# Patient Record
Sex: FEMALE | Race: WHITE | NOT HISPANIC OR LATINO | Employment: FULL TIME | URBAN - METROPOLITAN AREA
[De-identification: names, ages, dates, MRNs, and addresses within clinical notes are randomized per-mention and may not be internally consistent; named-entity substitution may affect disease eponyms.]

---

## 2020-11-02 ENCOUNTER — HOSPITAL ENCOUNTER (OUTPATIENT)
Dept: LAB | Facility: HOSPITAL | Age: 20
Discharge: HOME | End: 2020-11-02
Attending: PEDIATRICS

## 2020-11-04 LAB
C TRACH RRNA CVX QL NAA+PROBE: NEGATIVE
N GONORRHOEA DNA SPEC QL NAA+PROBE: NEGATIVE

## 2021-12-21 ENCOUNTER — OFFICE VISIT (OUTPATIENT)
Dept: INTERNAL MEDICINE | Facility: CLINIC | Age: 21
End: 2021-12-21

## 2021-12-21 VITALS
OXYGEN SATURATION: 97 % | HEART RATE: 51 BPM | SYSTOLIC BLOOD PRESSURE: 112 MMHG | BODY MASS INDEX: 34.13 KG/M2 | TEMPERATURE: 97.5 F | DIASTOLIC BLOOD PRESSURE: 70 MMHG | WEIGHT: 225.2 LBS | HEIGHT: 68 IN | RESPIRATION RATE: 18 BRPM

## 2021-12-21 DIAGNOSIS — Z76.89 ENCOUNTER TO ESTABLISH CARE: Primary | ICD-10-CM

## 2021-12-21 DIAGNOSIS — M25.511 CHRONIC RIGHT SHOULDER PAIN: ICD-10-CM

## 2021-12-21 DIAGNOSIS — R53.83 FATIGUE, UNSPECIFIED TYPE: ICD-10-CM

## 2021-12-21 DIAGNOSIS — Z13.220 SCREENING FOR LIPID DISORDERS: ICD-10-CM

## 2021-12-21 DIAGNOSIS — G89.29 CHRONIC RIGHT SHOULDER PAIN: ICD-10-CM

## 2021-12-21 DIAGNOSIS — N30.00 ACUTE CYSTITIS WITHOUT HEMATURIA: ICD-10-CM

## 2021-12-21 DIAGNOSIS — R39.9 UTI SYMPTOMS: ICD-10-CM

## 2021-12-21 LAB
BILIRUB BLD-MCNC: ABNORMAL MG/DL
CLARITY, POC: ABNORMAL
COLOR UR: ABNORMAL
EXPIRATION DATE: ABNORMAL
GLUCOSE UR STRIP-MCNC: NEGATIVE MG/DL
KETONES UR QL: NEGATIVE
LEUKOCYTE EST, POC: ABNORMAL
Lab: ABNORMAL
NITRITE UR-MCNC: POSITIVE MG/ML
PH UR: 6 [PH] (ref 5–8)
PROT UR STRIP-MCNC: ABNORMAL MG/DL
RBC # UR STRIP: NEGATIVE /UL
SP GR UR: 1.03 (ref 1–1.03)
UROBILINOGEN UR QL: ABNORMAL

## 2021-12-21 PROCEDURE — 87086 URINE CULTURE/COLONY COUNT: CPT

## 2021-12-21 PROCEDURE — 81003 URINALYSIS AUTO W/O SCOPE: CPT

## 2021-12-21 PROCEDURE — 99204 OFFICE O/P NEW MOD 45 MIN: CPT

## 2021-12-21 PROCEDURE — 87186 SC STD MICRODIL/AGAR DIL: CPT

## 2021-12-21 RX ORDER — CIPROFLOXACIN 250 MG/1
250 TABLET, FILM COATED ORAL 2 TIMES DAILY
Qty: 10 TABLET | Refills: 0 | Status: SHIPPED | OUTPATIENT
Start: 2021-12-21 | End: 2021-12-21

## 2021-12-21 RX ORDER — FLUCONAZOLE 150 MG/1
150 TABLET ORAL ONCE
Qty: 1 TABLET | Refills: 0 | Status: SHIPPED | OUTPATIENT
Start: 2021-12-21 | End: 2021-12-21

## 2021-12-21 RX ORDER — CEPHALEXIN 500 MG/1
500 CAPSULE ORAL 3 TIMES DAILY
Qty: 21 CAPSULE | Refills: 0 | Status: SHIPPED | OUTPATIENT
Start: 2021-12-21 | End: 2022-11-21

## 2021-12-21 NOTE — ASSESSMENT & PLAN NOTE
Patient started with UTI symptoms approximately 3 days ago.  She has urinary frequency, dysuria and pressure.  No hematuria.  She has been taking Azo.  She denies flank pain.  She states she has had multiple UTIs in the past used to get them frequently.  Urinalysis is positive for bacteria and nitrites.  Plan: We will start keflex.  Patient admits that she does get yeast infections sometimes with antibiotics.  Prescription given for Diflucan as well.  Send urine off for culture.

## 2021-12-21 NOTE — PROGRESS NOTES
"Chief Complaint  Establish Care, Urinary Tract Infection (pain when urinating, urgency with little flow.), Shoulder Problem (was seen at  and was told to follow up with PCP, may have torn rotater cuff), and Immunizations (wants to get flu immunization)    Subjective          History of Present Illness  Emilee Flores presents to [facility]  New pt, establish care.   She does have a PMH including SVT. She used to be on beta blocker but was able to wean herself off. She did see cardiology. Dr. NOEL CHAVEZT resolved itself.  No recent episodes.    Last PCP was in 2018.    Shoulder pain x 3 months. No known injury. She is having a hard time sleeping. Certain ROM exercises cause pain.  Urgent care-shoulder x-ray negative. She has been taking diclofenac and it helps some. Lifting arm behind head makes it worse.    UTI symptoms-Urinary frequency, dysuria, and pressure for 3 days. No hematuria. She has been taking azo. She has had UTI's in the past.     Pap-Not yet.  Counseled.  Flu-Today  COVID 19-UTD      Objective   Vital Signs:   /70 (BP Location: Left arm, Patient Position: Sitting, Cuff Size: Large Adult)   Pulse 51   Temp 97.5 °F (36.4 °C) (Temporal)   Resp 18   Ht 171.5 cm (67.5\")   Wt 102 kg (225 lb 3.2 oz)   SpO2 97%   BMI 34.75 kg/m²     Physical Exam  Eyes:      Extraocular Movements: Extraocular movements intact.      Conjunctiva/sclera: Conjunctivae normal.   Cardiovascular:      Rate and Rhythm: Normal rate and regular rhythm.      Pulses: Normal pulses.      Heart sounds: Normal heart sounds.   Pulmonary:      Effort: Pulmonary effort is normal. No respiratory distress.      Breath sounds: Normal breath sounds. No stridor. No wheezing, rhonchi or rales.   Chest:      Chest wall: No tenderness.   Abdominal:      General: Bowel sounds are normal.      Palpations: Abdomen is soft.      Tenderness: There is no abdominal tenderness. There is no right CVA tenderness or " left CVA tenderness.   Musculoskeletal:      Right shoulder: Decreased range of motion.      Cervical back: Normal range of motion and neck supple.      Right lower leg: No edema.      Left lower leg: No edema.   Skin:     General: Skin is warm and dry.   Neurological:      Mental Status: She is alert and oriented to person, place, and time.   Psychiatric:         Mood and Affect: Mood normal.         Behavior: Behavior normal.         Thought Content: Thought content normal.         Judgment: Judgment normal.        Result Review :                 Assessment and Plan    Diagnoses and all orders for this visit:    1. Encounter to establish care (Primary)    2. Chronic right shoulder pain  Assessment & Plan:  Patient planes of chronic right shoulder pain.  She states has been present for the last 3 months and it is progressively gotten worse.  No known significant injury.  She states she is having a hard time sleeping.  She states that certain range of motion exercises cause pain.  Mostly when reaching backwards.  She states she cannot put her hair up.  She had a shoulder x-ray and urgent care and it did come back negative.  They gave her some diclofenac and that is helping some.  She states it makes it tolerable.  Plan: We will start physical therapy and try to get an MRI of her shoulder.  Continue using diclofenac as needed for discomfort.    Orders:  -     MRI Shoulder Right Without Contrast; Future  -     Ambulatory Referral to Physical Therapy Evaluate and treat    3. Acute cystitis without hematuria  Assessment & Plan:  Patient started with UTI symptoms approximately 3 days ago.  She has urinary frequency, dysuria and pressure.  No hematuria.  She has been taking Azo.  She denies flank pain.  She states she has had multiple UTIs in the past used to get them frequently.  Urinalysis is positive for bacteria and nitrites.  Plan: We will start keflex.  Patient admits that she does get yeast infections sometimes  with antibiotics.  Prescription given for Diflucan as well.  Send urine off for culture.        4. Fatigue, unspecified type  -     CBC & Differential; Future  -     Comprehensive Metabolic Panel; Future  -     Lipid Panel; Future  -     Iron Profile; Future  -     Vitamin B12 & Folate; Future  -     Vitamin D 25 Hydroxy; Future  -     TSH+Free T4; Future    5. Screening for lipid disorders  -     Lipid Panel; Future    6. UTI symptoms  -     POCT urinalysis dipstick, automated  -     Urine Culture - Urine, Urine, Clean Catch    Other orders  -     Discontinue: ciprofloxacin (Cipro) 250 MG tablet; Take 1 tablet by mouth 2 (Two) Times a Day.  Dispense: 10 tablet; Refill: 0  -     fluconazole (Diflucan) 150 MG tablet; Take 1 tablet by mouth 1 (One) Time for 1 dose.  Dispense: 1 tablet; Refill: 0  -     cephalexin (Keflex) 500 MG capsule; Take 1 capsule by mouth 3 (Three) Times a Day.  Dispense: 21 capsule; Refill: 0      Follow Up   Return in about 8 months (around 8/8/2022).  Patient was given instructions and counseling regarding her condition or for health maintenance advice. Please see specific information pulled into the AVS if appropriate.

## 2021-12-21 NOTE — ASSESSMENT & PLAN NOTE
Patient planes of chronic right shoulder pain.  She states has been present for the last 3 months and it is progressively gotten worse.  No known significant injury.  She states she is having a hard time sleeping.  She states that certain range of motion exercises cause pain.  Mostly when reaching backwards.  She states she cannot put her hair up.  She had a shoulder x-ray and urgent care and it did come back negative.  They gave her some diclofenac and that is helping some.  She states it makes it tolerable.  Plan: We will start physical therapy and try to get an MRI of her shoulder.  Continue using diclofenac as needed for discomfort.

## 2021-12-22 ENCOUNTER — PATIENT ROUNDING (BHMG ONLY) (OUTPATIENT)
Dept: INTERNAL MEDICINE | Facility: CLINIC | Age: 21
End: 2021-12-22

## 2021-12-22 NOTE — PROGRESS NOTES
December 22, 2021    Hello, may I speak with Emilee Flores?    My name is Joan Greenwood    I am  with [facility]  [address] [address].    Before we get started may I verify your date of birth? 2000    I am calling to officially welcome you to our practice and ask about your recent visit. Is this a good time to talk? yes    Tell me about your visit with us. What things went well?  it went really well       We're always looking for ways to make our patients' experiences even better. Do you have recommendations on ways we may improve?  no    Overall were you satisfied with your first visit to our practice? yes       I appreciate you taking the time to speak with me today. Is there anything else I can do for you? no      Thank you, and have a great day.

## 2021-12-26 LAB
BACTERIA UR CULT: ABNORMAL
BACTERIA UR CULT: ABNORMAL
OTHER ANTIBIOTIC SUSC ISLT: ABNORMAL

## 2021-12-27 DIAGNOSIS — B37.9 CANDIDIASIS: Primary | ICD-10-CM

## 2021-12-27 RX ORDER — FLUCONAZOLE 150 MG/1
TABLET ORAL
Qty: 2 TABLET | Refills: 0 | Status: SHIPPED | OUTPATIENT
Start: 2021-12-27 | End: 2022-11-21

## 2022-01-12 ENCOUNTER — HOSPITAL ENCOUNTER (OUTPATIENT)
Dept: MRI IMAGING | Facility: HOSPITAL | Age: 22
Discharge: HOME | End: 2022-01-12

## 2022-01-12 DIAGNOSIS — M25.511 CHRONIC RIGHT SHOULDER PAIN: ICD-10-CM

## 2022-01-12 DIAGNOSIS — G89.29 CHRONIC RIGHT SHOULDER PAIN: ICD-10-CM

## 2022-01-12 PROCEDURE — 73221 MRI JOINT UPR EXTREM W/O DYE: CPT

## 2022-01-13 ENCOUNTER — TREATMENT (OUTPATIENT)
Dept: PHYSICAL THERAPY | Facility: CLINIC | Age: 22
End: 2022-01-13

## 2022-01-13 DIAGNOSIS — M25.511 ACUTE PAIN OF RIGHT SHOULDER: Primary | ICD-10-CM

## 2022-01-13 DIAGNOSIS — R29.898 SHOULDER WEAKNESS: ICD-10-CM

## 2022-01-13 PROCEDURE — 97161 PT EVAL LOW COMPLEX 20 MIN: CPT | Performed by: PHYSICAL THERAPIST

## 2022-01-13 PROCEDURE — 97110 THERAPEUTIC EXERCISES: CPT | Performed by: PHYSICAL THERAPIST

## 2022-01-13 NOTE — PROGRESS NOTES
Physical Therapy Initial Evaluation and Plan of Care    Patient: Emilee Flores   : 2000  Diagnosis/ICD-10 Code:  Acute pain of right shoulder [M25.511]  Referring practitioner: BRENNAN Pitts  Date of Initial Visit: 2022  Today's Date: 2022  Patient seen for 1 sessions           Subjective Questionnaire: QuickDASH: 27 or 20-39 % limited      Subjective Evaluation    History of Present Illness  Mechanism of injury: Pt reports about three months ago she started having more pain in her R shoulder towards the outside.  She can't do a lot of activity with her arm, such as raising her arm and doing her hair.  She also reports pain with reaching behind her back.  She states she has been off of work and is resting so her pain is reduced.  Pt works at [employer].  Pt reports tingling in her shoulder.  Pt had an MRI performed which was negative.     Medical history: SVT (no longer taking medicine)    Pain  Current pain ratin  At best pain ratin  At worst pain ratin  Quality: dull ache  Aggravating factors: overhead activity, lifting, outstretched reach and movement  Progression: worsening    Diagnostic Tests  X-ray: normal  MRI studies: normal    Treatments  No previous or current treatments  Patient Goals  Patient goals for therapy: decreased pain, increased motion, increased strength, independence with ADLs/IADLs and return to work             Objective          Static Posture     Head  Forward.    Shoulders  Rounded.    Thoracic Spine  Hyperkyphosis.    Palpation     Right Tenderness of the pectoralis major, supraspinatus and upper trapezius.     Neurological Testing     Sensation     Shoulder   Left Shoulder   Intact: light touch    Right Shoulder   Intact: light touch    Additional Neurological Details  Tingling around R GH joint    Active Range of Motion     Right Shoulder   Normal active range of motion    Passive Range of Motion     Right Shoulder   Normal passive range of motion    Joint  Play   Left Shoulder  Joints within functional limits are the posterior capsule and inferior capsule. Hypermobile in the anterior capsule.     Right Shoulder  Joints within functional limits are the posterior capsule and inferior capsule. Hypermobile in the anterior capsule.     Strength/Myotome Testing     Left Shoulder     Isolated Muscles   Lower trapezius: 4-   Middle trapezius: 4-     Right Shoulder     Planes of Motion   Flexion: 4   Abduction: 4   External rotation at 0°: 4   Internal rotation at 0°: 5     Isolated Muscles   Lower trapezius: 4-   Middle trapezius: 4-     Tests     Right Shoulder   Positive empty can.   Negative crossover, Hawkin's, lift-off and Neer's.         See Exercise, Manual, and Modality Logs for complete treatment.       Assessment & Plan     Assessment  Impairments: impaired physical strength, lacks appropriate home exercise program and pain with function  Functional Limitations: carrying objects, lifting, pulling, reaching behind back, reaching overhead and unable to perform repetitive tasks  Assessment details: Pt presents with limitations, noted below, that impede her ability to lift objects overhead, do her hair, and perform ADLs.  The patient presents with a diagnosis of R shoulder pain and has R shoulder weakness, postural impairments, and poor scapular stability and will benefit from therapeutic exercises, manual therapy, and modalities to improve tolerance to functional activities. The skills of a therapist will be required to safely and effectively implement the following treatment plan to restore maximal level of function.     Prognosis: good    Goals  Plan Goals: 1. Carrying, Moving, and Handling Objects Functional Limitation     LTG 1: 12 weeks:  The patient will demonstrate 1-19% limitation by achieving a score of 12 on the Quick DASH.   STATUS:  New   STG 1a: 6 weeks:  The patient will demonstrate 1-19% limitation by achieving a score of 19 on the Quick DASH.      STATUS:  New      2. The patient has limited strength of the R shoulder.   LTG 2: 12 weeks:  The patient will demonstrate 5/5 strength for R shoulder flexion, abduction, and external rotation and 4+/5 strength for middle and lower trap in order to demonstrate improved shoulder stability.   STATUS:  New   STG 2a: 6 weeks:   The patient will demonstrate 4+/5 strength for R shoulder flexion, abduction, and external rotation and 4/5 strength for middle and lower trap in order to demonstrate improved shoulder stability. STATUS:  New   STG2b:  6 weeks:  The patient will be independent with home exercises.    STATUS:  New      3. The patient complains of pain to the R shoulder.   LTG 3: 12 weeks:  The patient will report a pain rating of 2/10 or better in order to improve sleep quality and tolerance to performance of activities of daily living.   STATUS:  New   STG 3a: 6 weeks:  The patient will report a pain rating of 3/10 or better.    STATUS:  New     TREATMENT: Manual therapy, therapeutic exercise, home exercise instruction, and modalities as needed to include: electrical stimulation, ultrasound, moist heat, and ice.     Plan  Therapy options: will be seen for skilled therapy services  Planned modality interventions: cryotherapy, dry needling, electrical stimulation/[place] stimulation, ultrasound and hydrotherapy  Planned therapy interventions: flexibility, functional ROM exercises, home exercise program, joint mobilization, manual therapy, neuromuscular re-education, postural training, soft tissue mobilization, spinal/joint mobilization, strengthening and stretching  Frequency: 3x week  Duration in weeks: 12  Treatment plan discussed with: patient        History # of Personal Factors and/or Comorbidities: LOW (0)  Examination of Body System(s): # of elements: LOW (1-2)  Clinical Presentation: STABLE   Clinical Decision Making: LOW       Timed:         Manual Therapy:         mins  91357;     Therapeutic Exercise:     8     mins  23030;     Neuromuscular Kenroy:        mins  82010;    Therapeutic Activity:          mins  36138;     Gait Training:           mins  80679;     Ultrasound:          mins  54621;    Ionto                                   mins   66939  Self Care                            mins   14083  Canalith Repos         mins 99634      Un-Timed:  Electrical Stimulation:         mins  39254 ( );  Dry Needling          mins self-pay  Traction          mins 72399  Low Eval     25     Mins  10259  Mod Eval          Mins  09198  High Eval                            Mins  55866  Re-Eval                               mins  13796        Timed Treatment:   8   mins   Total Treatment:     33   mins    PT SIGNATURE: Electronically signed by JUAN Pacheco License: [id number]      Initial Certification  Certification Period: 1/13/2022 thru 4/12/2022  I certify that the therapy services are furnished while this patient is under my care.  The services outlined above are required by this patient, and will be reviewed every 90 days.     PHYSICIAN: Breann Stevens APRN      DATE:     Please sign and return via fax to [phone].Thank you, [facility].

## 2022-01-17 ENCOUNTER — OFFICE VISIT (OUTPATIENT)
Dept: INTERNAL MEDICINE | Facility: CLINIC | Age: 22
End: 2022-01-17

## 2022-01-17 VITALS
DIASTOLIC BLOOD PRESSURE: 62 MMHG | HEART RATE: 90 BPM | SYSTOLIC BLOOD PRESSURE: 112 MMHG | WEIGHT: 218.8 LBS | OXYGEN SATURATION: 98 % | BODY MASS INDEX: 33.16 KG/M2 | RESPIRATION RATE: 18 BRPM | HEIGHT: 68 IN

## 2022-01-17 DIAGNOSIS — G89.29 CHRONIC RIGHT SHOULDER PAIN: Primary | ICD-10-CM

## 2022-01-17 DIAGNOSIS — M25.511 CHRONIC RIGHT SHOULDER PAIN: Primary | ICD-10-CM

## 2022-01-17 DIAGNOSIS — Z11.59 NEED FOR HEPATITIS C SCREENING TEST: ICD-10-CM

## 2022-01-17 DIAGNOSIS — Z00.00 ANNUAL PHYSICAL EXAM: ICD-10-CM

## 2022-01-17 PROCEDURE — 99395 PREV VISIT EST AGE 18-39: CPT

## 2022-01-17 RX ORDER — CYCLOBENZAPRINE HCL 10 MG
10 TABLET ORAL 3 TIMES DAILY PRN
Qty: 30 TABLET | Refills: 0 | Status: SHIPPED | OUTPATIENT
Start: 2022-01-17 | End: 2022-11-21

## 2022-01-17 NOTE — PROGRESS NOTES
"Chief Complaint  Follow-up (had XR and MRI done. Keflex and Diflucan was given to her on first visit and it did help.)    Subjective          History of Present Illness  Emilee Flores presents to [facility]  Follow up on Right shoulder pain and annual physical.   She had x-ray and MRI, both negative. She has also been going to PT. She was sent home with exercise bands. PT wants to see her twice/week for a few weeks and if not better, go to Ortho. Pain is worse after use for an extended period of time. She is not back to work yet. She is supposed to go back to work tomorrow.     Flu-recommended  COVID-has had  Exercise-she has started exercising. She does some lower body exercises a few days/week.  Smoke-No cig use  Denies alcohol use  Wears seat belt.  Pap-recommended. We had a long discussion regarding PAP, OBGYN, and birth control. She is going to do some research regarding the different contraceptives that we discussed today. She is considering going to OBGYN with PAP and contraceptives but would like to think about it.   She does monthly self breast exams.  Menses are normal.    Objective   Vital Signs:   /62 (BP Location: Left arm, Patient Position: Sitting, Cuff Size: Adult)   Pulse 90   Resp 18   Ht 171.5 cm (67.5\")   Wt 99.2 kg (218 lb 12.8 oz)   SpO2 98%   BMI 33.76 kg/m²     Physical Exam  Constitutional:       Appearance: Normal appearance.   HENT:      Head: Normocephalic and atraumatic.      Mouth/Throat:      Mouth: Mucous membranes are moist.      Pharynx: Oropharynx is clear.   Eyes:      Extraocular Movements: Extraocular movements intact.      Conjunctiva/sclera: Conjunctivae normal.   Cardiovascular:      Rate and Rhythm: Normal rate and regular rhythm.      Pulses: Normal pulses.      Heart sounds: Normal heart sounds.   Pulmonary:      Effort: Pulmonary effort is normal. No respiratory distress.      Breath sounds: No stridor. No wheezing, rhonchi " or rales.   Abdominal:      General: Bowel sounds are normal. There is no distension.      Palpations: Abdomen is soft.      Tenderness: There is no abdominal tenderness.   Musculoskeletal:         General: Normal range of motion.      Cervical back: Normal range of motion and neck supple. No tenderness.      Right lower leg: No edema.      Left lower leg: No edema.   Lymphadenopathy:      Cervical: No cervical adenopathy.   Skin:     General: Skin is warm and dry.      Coloration: Skin is pale.   Neurological:      General: No focal deficit present.      Mental Status: She is alert and oriented to person, place, and time.   Psychiatric:         Mood and Affect: Mood normal.         Behavior: Behavior normal.         Thought Content: Thought content normal.         Judgment: Judgment normal.        Result Review :                 Assessment and Plan    Diagnoses and all orders for this visit:    1. Chronic right shoulder pain (Primary)  Assessment & Plan:  She had x-ray and MRI, both negative. She has also been going to PT. She was sent home with exercise bands and they want to see her twice/week for a few weeks and if not better, go to Ortho. Pain is worse after use for an extended period of time. She is not back to work yet. She is supposed to go back to work tomorrow. Note written for light duty. I gave her diclofenac for PRN use. She has pain in her right shoulder and she states that the pain extends to her upper trapezius area. She denies any neck involvement.   Plan: PT. If no improvement, referral to ortho. Rx for flexeril also given for PRN use.       2. Annual physical exam    3. Need for hepatitis C screening test  -     Hepatitis C antibody; Future    Other orders  -     cyclobenzaprine (FLEXERIL) 10 MG tablet; Take 1 tablet by mouth 3 (Three) Times a Day As Needed for Muscle Spasms.  Dispense: 30 tablet; Refill: 0      Follow Up   No follow-ups on file.  Patient was given instructions and counseling  regarding her condition or for health maintenance advice. Please see specific information pulled into the AVS if appropriate.

## 2022-01-17 NOTE — ASSESSMENT & PLAN NOTE
She had x-ray and MRI, both negative. She has also been going to PT. She was sent home with exercise bands and they want to see her twice/week for a few weeks and if not better, go to Ortho. Pain is worse after use for an extended period of time. She is not back to work yet. She is supposed to go back to work tomorrow. Note written for light duty. I gave her diclofenac for PRN use. She has pain in her right shoulder and she states that the pain extends to her upper trapezius area. She denies any neck involvement.   Plan: PT. If no improvement, referral to ortho. Rx for flexeril also given for PRN use.

## 2022-01-19 ENCOUNTER — TELEPHONE (OUTPATIENT)
Dept: PHYSICAL THERAPY | Facility: CLINIC | Age: 22
End: 2022-01-19

## 2022-02-08 ENCOUNTER — TREATMENT (OUTPATIENT)
Dept: PHYSICAL THERAPY | Facility: CLINIC | Age: 22
End: 2022-02-08

## 2022-02-08 DIAGNOSIS — M25.511 ACUTE PAIN OF RIGHT SHOULDER: Primary | ICD-10-CM

## 2022-02-08 DIAGNOSIS — R29.898 SHOULDER WEAKNESS: ICD-10-CM

## 2022-02-08 PROCEDURE — 97110 THERAPEUTIC EXERCISES: CPT | Performed by: PHYSICAL THERAPIST

## 2022-02-08 NOTE — PROGRESS NOTES
Progress Assessment        Patient: Emilee Flores   : 2000  Diagnosis/ICD-10 Code:  Acute pain of right shoulder [M25.511]  Referring practitioner: BRENNAN Pitts  Date of Initial Visit: Type: THERAPY  Noted: 2022  Today's Date: 2022  Patient seen for 2 sessions      Subjective:   Emilee Flores reports: she has not been to her therapy appointments because of Covid and car issues.  She states she has performed her exercises when she can remember, which has only been five or six times since her evaluation.  She states her shoulder is a little better.  She denies pain currently but states it increases to a 5/10 at worst.  She states she avoids using it for repetitive activities.     Subjective Questionnaire: QuickDASH: 23 or 20-39% limited  Clinical Progress: unchanged  Home Program Compliance: No  Treatment has included: therapeutic exercise    Subjective     Objective   Strength/Myotome Testing     Left Shoulder      Isolated Muscles   Lower trapezius: 4-   Middle trapezius: 4-     Right Shoulder      Planes of Motion   Flexion: 4   Abduction: 4   External rotation at 0°: 4   Internal rotation at 0°: 5      Isolated Muscles   Lower trapezius: 4-   Middle trapezius: 4-     Assessment/Plan  Pt has only attended her initial evaluation and has not had any treatment sessions yet.  She has performed her HEP 5-6 times since starting therapy.  Pt has had limited progress towards goals as she has not yet had PT treatment sessions.  Her shoulder/scapular stability/strength remains the same.  Pain remains unchanged.  Her score on the Quick DASH improved slightly.  Pt requires continued PT to increase strength and stability of shoulder and decrease pain.    Plan Goals: 1. Carrying, Moving, and Handling Objects Functional Limitation                        LTG 1: 12 weeks:  The patient will demonstrate 1-19% limitation by achieving a score of 12 on the Quick DASH.   STATUS:  ongoing  STG 1a: 6 weeks:  The patient  will demonstrate 1-19% limitation by achieving a score of 19 on the Quick DASH.     STATUS:  progressing     2. The patient has limited strength of the R shoulder.   LTG 2: 12 weeks:  The patient will demonstrate 5/5 strength for R shoulder flexion, abduction, and external rotation and 4+/5 strength for middle and lower trap in order to demonstrate improved shoulder stability.   STATUS:  ongoing  STG 2a: 6 weeks:   The patient will demonstrate 4+/5 strength for R shoulder flexion, abduction, and external rotation and 4/5 strength for middle and lower trap in order to demonstrate improved shoulder stability. STATUS:  ongoing   STG2b:  6 weeks:  The patient will be independent with home exercises.    STATUS:  ongoing     3. The patient complains of pain to the R shoulder.   LTG 3: 12 weeks:  The patient will report a pain rating of 2/10 or better in order to improve sleep quality and tolerance to performance of activities of daily living.   STATUS:  ongoing  STG 3a: 6 weeks:  The patient will report a pain rating of 3/10 or better.    STATUS:  ongoing    Progress toward previous goals: Not Met    See Exercise, Manual, and Modality Logs for complete treatment.         Recommendations: Continue as planned  Timeframe: 3 months  Prognosis to achieve goals: good    PT Signature: Electronically signed by Liat Leos PT  [place] License: [id number]      Based upon review of the patient's progress and continued therapy plan, it is my medical opinion that Emilee Flores should continue physical therapy treatment at [facility]  [address]  [place] [address] [phone].      Timed:         Manual Therapy:         mins  95252;     Therapeutic Exercise:    23     mins  21940;     Neuromuscular Kenroy:        mins  86463;    Therapeutic Activity:          mins  98430;     Gait Training:           mins  42489;     Ultrasound:          mins  32672;    Ionto                                    mins   66822  Self Care                            mins   49464  Aquatic                               mins 04476      Un-Timed:  Electrical Stimulation:         mins  63630 ( );  Dry Needling          mins self-pay  Traction          mins 35946  Low Eval          Mins  80249  Mod Eval          Mins  13902  High Eval                            Mins  20886  Re-Eval                               mins  33532      Timed Treatment:   23   mins   Total Treatment:     23   mins      I certify that the therapy services are furnished while this patient is under my care.  The services outlined above are required by this patient, and will be reviewed every 90 days.

## 2022-02-11 ENCOUNTER — TREATMENT (OUTPATIENT)
Dept: PHYSICAL THERAPY | Facility: CLINIC | Age: 22
End: 2022-02-11

## 2022-02-11 DIAGNOSIS — R29.898 SHOULDER WEAKNESS: ICD-10-CM

## 2022-02-11 DIAGNOSIS — M25.511 ACUTE PAIN OF RIGHT SHOULDER: Primary | ICD-10-CM

## 2022-02-11 PROCEDURE — 97110 THERAPEUTIC EXERCISES: CPT | Performed by: PHYSICAL THERAPIST

## 2022-02-11 NOTE — PROGRESS NOTES
Physical Therapy Daily Treatment Note      Patient: Emilee Flores   : 2000  Referring practitioner: BRENNAN Pitts  Date of Initial Visit: Type: THERAPY  Noted: 2022  Today's Date: 2022  Patient seen for 3 sessions           Subjective Questionnaire:       Subjective Evaluation    History of Present Illness    Subjective comment: Pt reported mornings are the best so she has minimal pain.         Objective   See Exercise, Manual, and Modality Logs for complete treatment.       Assessment & Plan     Assessment    Assessment details: Pt is off work secondary to limited RUE function and pain. Pt reported feeling a know in her shoulder after therapeutic exercis.   Continue with PT to improve pt's RUE function.        Visit Diagnoses:    ICD-10-CM ICD-9-CM   1. Acute pain of right shoulder  M25.511 719.41   2. Shoulder weakness  R29.898 719.61       Progress per Plan of Care and Progress strengthening /stabilization /functional activity           Timed:  Manual Therapy:         mins  39360;  Therapeutic Exercise:    25     mins  89881;     Neuromuscular Kenroy:        mins  35569;    Therapeutic Activity:          mins  64328;     Gait Training:           mins  95775;     Ultrasound:          mins  71715;    Electrical Stimulation:         mins  68306 ( );  Aquatic Therapy          mins  67220    Untimed:  Electrical Stimulation:         mins  01696 ( );  Mechanical Traction:         mins  81082;     Timed Treatment:   25   mins   Total Treatment:     25   mins    Electronically signed    Zaida Melendez PTA  Physical Therapist Assistant    [place] license: [id number]

## 2022-02-14 ENCOUNTER — TREATMENT (OUTPATIENT)
Dept: PHYSICAL THERAPY | Facility: CLINIC | Age: 22
End: 2022-02-14

## 2022-02-14 DIAGNOSIS — M25.511 ACUTE PAIN OF RIGHT SHOULDER: Primary | ICD-10-CM

## 2022-02-14 DIAGNOSIS — R29.898 SHOULDER WEAKNESS: ICD-10-CM

## 2022-02-14 PROCEDURE — 97530 THERAPEUTIC ACTIVITIES: CPT | Performed by: PHYSICAL THERAPIST

## 2022-02-14 PROCEDURE — 97110 THERAPEUTIC EXERCISES: CPT | Performed by: PHYSICAL THERAPIST

## 2022-02-14 NOTE — PROGRESS NOTES
Physical Therapy Daily Progress Note    Patient: Emilee Flores   : 2000  Diagnosis/ICD-10 Code:  Acute pain of right shoulder [M25.511]  Referring practitioner: BRENNAN Pitts  Date of Initial Visit: Type: THERAPY  Noted: 2022  Today's Date: 2022  Patient seen for 4 sessions           Subjective   The patient reported no new complaints today. She has been compliant with her home exercises and she feels like her shoulder is improving.     Objective   See Exercise, Manual, and Modality Logs for complete treatment.     Assessment/Plan   The patient demonstrated good tolerance to a progression of shoulder strengthening exercise today. Continue with current PT plan of care.       Timed:  Manual Therapy:    0     mins  51963;  Therapeutic Exercise:    18     mins  86009;     Neuromuscular Kenroy:   0    mins  38565;    Therapeutic Activity:     8     mins  52823;     Gait Trainin     mins  19838;     Aquatics                         0      mins  00899    Un-timed:  Mechanical Traction      0     mins  32330  Dry Needling     0     mins self-pay  Electrical Stimulation:    0     mins  82583 ( );      Timed Treatment:   26   mins   Total Treatment:     26   mins    Ismael Burch PT  Physical Therapist    Electronically signed 2022    [place] License: PT - [id number]

## 2022-02-16 ENCOUNTER — TREATMENT (OUTPATIENT)
Dept: PHYSICAL THERAPY | Facility: CLINIC | Age: 22
End: 2022-02-16

## 2022-02-16 DIAGNOSIS — M25.511 ACUTE PAIN OF RIGHT SHOULDER: Primary | ICD-10-CM

## 2022-02-16 DIAGNOSIS — R29.898 SHOULDER WEAKNESS: ICD-10-CM

## 2022-02-16 PROCEDURE — 97110 THERAPEUTIC EXERCISES: CPT | Performed by: PHYSICAL THERAPIST

## 2022-02-16 PROCEDURE — 97530 THERAPEUTIC ACTIVITIES: CPT | Performed by: PHYSICAL THERAPIST

## 2022-02-16 NOTE — PROGRESS NOTES
Physical Therapy Daily Progress Note    Patient: Emilee Flores   : 2000  Diagnosis/ICD-10 Code:  Acute pain of right shoulder [M25.511]  Referring practitioner: BRENNAN Pitts  Date of Initial Visit: Type: THERAPY  Noted: 2022  Today's Date: 2022  Patient seen for 5 sessions           Subjective   The patient reported that her shoulder isn't really hurting today. She wasn't too sore after her last PT session.    Objective   See Exercise, Manual, and Modality Logs for complete treatment.     Assessment/Plan  The patient demonstrated good tolerance to exercise progression today. Her shoulder is progressing well as indicated by improved exercise performance and reduction in pain. She may be ready for discharge next session if pain is still minimal.       Timed:  Manual Therapy:    0     mins  78304;  Therapeutic Exercise:    18     mins  27647;     Neuromuscular Kenroy:   0    mins  83212;    Therapeutic Activity:     10     mins  64421;     Gait Trainin     mins  57609;     Aquatics                         0      mins  97845    Un-timed:  Mechanical Traction      0     mins  68317  Dry Needling     0     mins self-pay  Electrical Stimulation:    0     mins  97078 ( );      Timed Treatment:   28   mins   Total Treatment:     28   mins    Ismael Burch PT  Physical Therapist    Electronically signed 2022    [place] License: PT - [id number]

## 2022-02-21 ENCOUNTER — TREATMENT (OUTPATIENT)
Dept: PHYSICAL THERAPY | Facility: CLINIC | Age: 22
End: 2022-02-21

## 2022-02-21 DIAGNOSIS — M25.511 ACUTE PAIN OF RIGHT SHOULDER: ICD-10-CM

## 2022-02-21 DIAGNOSIS — R29.898 SHOULDER WEAKNESS: Primary | ICD-10-CM

## 2022-02-21 PROCEDURE — 97110 THERAPEUTIC EXERCISES: CPT | Performed by: PHYSICAL THERAPIST

## 2022-02-21 NOTE — PROGRESS NOTES
Physical Therapy Daily Treatment Note    Patient: Emilee Flores   : 2000  Referring practitioner: BRENNAN Pitts  Date of Initial Visit: Type: THERAPY  Noted: 2022  Today's Date: 2022  Patient seen for 6 sessions         Subjective   Emilee Flores reports: Pt reports that she is doing good as she has not done much activities today. She reports 2/10 pain.    Objective   See Exercise, Manual, and Modality Logs for complete treatment.     Assessment & Plan     Assessment  Impairments: pain with function  Functional Limitations: uncomfortable because of pain and standing  Assessment details: Pt tolerated well overall. Progressed some of exercises today in repetitions due to improved tolerance.     Plan  Therapy options: will be seen for skilled therapy services  Frequency: 2x week  Plan details: Continue with POC.      Visit Diagnoses:  No diagnosis found.    Progress per Plan of Care         Timed:  Manual Therapy:    0     mins  07623;  Therapeutic Exercise:    25     mins  83861;     Neuromuscular Kenroy:    0    mins  99428;    Therapeutic Activity:     0     mins  82634;     Gait Trainin     mins  73337;     Aquatic Therapy     0     mins  72482;     Ultrasound:     0     mins  13263;    Electrical Stimulation:    0     mins  32101 ( );    Untimed:  Electrical Stimulation:    0     mins  09818 ( );  Mechanical Traction:    0     mins  97778;     Timed Treatment:   25  mins   Total Treatment:     25   mins  Heather Salcido PT    [place] License: [id number]

## 2022-02-23 ENCOUNTER — TREATMENT (OUTPATIENT)
Dept: PHYSICAL THERAPY | Facility: CLINIC | Age: 22
End: 2022-02-23

## 2022-02-23 PROCEDURE — 97110 THERAPEUTIC EXERCISES: CPT | Performed by: PHYSICAL THERAPIST

## 2022-02-23 NOTE — PROGRESS NOTES
Physical Therapy Daily Treatment Note    Patient: Emilee Flores   : 2000  Referring practitioner: BRENNAN Pitts  Date of Initial Visit: Type: THERAPY  Noted: 2022  Today's Date: 2022  Patient seen for 7 sessions         Subjective   Emilee Flores reports: She is experiencing 4/10 pain today.    Objective          Functional Assessment     Comments  Increased upper trap compensation with few exercises, which required PT's cueing.       See Exercise, Manual, and Modality Logs for complete treatment.     Assessment & Plan     Assessment    Assessment details: Pt tolerated session well overall. Some of her exercises were progressed today.    Plan  Plan details: Continue with POC.        Visit Diagnoses:  No diagnosis found.    Progress per Plan of Care         Timed:  Manual Therapy:    0     mins  40652;  Therapeutic Exercise:    25     mins  68232;     Neuromuscular Kenroy:    0    mins  34238;    Therapeutic Activity:     0     mins  48724;     Gait Trainin     mins  65851;     Aquatic Therapy     0     mins  80139;     Ultrasound:     0     mins  95075;    Electrical Stimulation:    0     mins  43314 ( );    Untimed:  Electrical Stimulation:    0     mins  56175 ( );  Mechanical Traction:    0     mins  54485;     Timed Treatment:   25   mins   Total Treatment:     25   mins  Heather Salcido PT    [place] License: [id number]

## 2022-03-02 ENCOUNTER — TREATMENT (OUTPATIENT)
Dept: PHYSICAL THERAPY | Facility: CLINIC | Age: 22
End: 2022-03-02

## 2022-03-02 DIAGNOSIS — M25.511 ACUTE PAIN OF RIGHT SHOULDER: ICD-10-CM

## 2022-03-02 DIAGNOSIS — R29.898 SHOULDER WEAKNESS: Primary | ICD-10-CM

## 2022-03-02 PROCEDURE — 97530 THERAPEUTIC ACTIVITIES: CPT | Performed by: PHYSICAL THERAPIST

## 2022-03-02 PROCEDURE — 97110 THERAPEUTIC EXERCISES: CPT | Performed by: PHYSICAL THERAPIST

## 2022-03-02 NOTE — PROGRESS NOTES
Physical Therapy Daily Treatment Note      Patient: Emilee Flores   : 2000  Referring practitioner: BRENNAN Pitts  Date of Initial Visit: Type: THERAPY  Noted: 2022  Today's Date: 3/2/2022  Patient seen for 8 sessions           Subjective Questionnaire:       Subjective Evaluation    History of Present Illness    Subjective comment: Pt reports that these last few weeks her R shoulder has felt better and no pain today.       Objective   See Exercise, Manual, and Modality Logs for complete treatment.       Assessment & Plan     Assessment    Assessment details: Pt exhibiting increased R shoulder strength tolerating therapeutic exercise well with no pain stating tenderness with wall taps.          Visit Diagnoses:    ICD-10-CM ICD-9-CM   1. Shoulder weakness  R29.898 719.61   2. Acute pain of right shoulder  M25.511 719.41       Progress per Plan of Care and Progress strengthening /stabilization /functional activity           Timed:  Manual Therapy:         mins  70501;  Therapeutic Exercise:    19     mins  87471;     Neuromuscular Kenroy:        mins  14883;    Therapeutic Activity:      8    mins  92278;     Gait Training:           mins  52023;     Ultrasound:          mins  77223;    Electrical Stimulation:         mins  94399 ( );  Aquatic Therapy          mins  70591    Untimed:  Electrical Stimulation:         mins  88406 ( );  Mechanical Traction:         mins  48076;     Timed Treatment:   27   mins   Total Treatment:     27   mins    Electronically signed    Zaida Melendez PTA  Physical Therapist Assistant    [place] license: [id number]

## 2022-03-04 ENCOUNTER — TREATMENT (OUTPATIENT)
Dept: PHYSICAL THERAPY | Facility: CLINIC | Age: 22
End: 2022-03-04

## 2022-03-04 DIAGNOSIS — R29.898 SHOULDER WEAKNESS: ICD-10-CM

## 2022-03-04 DIAGNOSIS — M25.511 ACUTE PAIN OF RIGHT SHOULDER: Primary | ICD-10-CM

## 2022-03-04 NOTE — PROGRESS NOTES
Physical Therapy Daily Treatment Note & Discharge    Patient: Emilee Flores   : 2000  Referring practitioner: BRENNAN Pitts  Date of Initial Visit: Type: THERAPY  Noted: 2022  Today's Date: 3/4/2022  Patient seen for 9 sessions         Subjective   Emilee Flores reports: She has made good overall improvement with her shoulder mobility and strength. She states that she is now able to do a lot of things that she was not able to do prior. She reports that her pain has not exceeded 2/10 this week.     Objective          Static Posture     Head  Forward.    Shoulders  Rounded.    Thoracic Spine  Hyperkyphosis.    Active Range of Motion     Right Shoulder   Normal active range of motion    Passive Range of Motion     Right Shoulder   Normal passive range of motion    Strength/Myotome Testing     Right Shoulder     Planes of Motion   Flexion: 4   Abduction: 4+   External rotation at 0°: 4+   Internal rotation at 0°: 5     Tests     Right Shoulder   Negative crossover, Hawkin's, lift-off and Neer's.       See Exercise, Manual, and Modality Logs for complete treatment.     Assessment & Plan     Assessment  Impairments: impaired physical strength, lacks appropriate home exercise program and pain with function  Functional Limitations: carrying objects, lifting, pulling, reaching behind back, reaching overhead and unable to perform repetitive tasks  Assessment details: Pt has made improvements in her shoulder strength and overall function. She is reporting minimal to no pain and no significant functional limitations. Pt has met most goals and is ready for discharge at this time.  Prognosis: good    Goals  Plan Goals: 1. Carrying, Moving, and Handling Objects Functional Limitation     LTG 1: 12 weeks:  The patient will demonstrate 1-19% limitation by achieving a score of 12 on the Quick DASH.   STATUS:  Not met  STG 1a: 6 weeks:  The patient will demonstrate 1-19% limitation by achieving a score of 19 on the Quick  DASH.     STATUS:  met     2. The patient has limited strength of the R shoulder.   LTG 2: 12 weeks:  The patient will demonstrate 5/5 strength for R shoulder flexion, abduction, and external rotation and 4+/5 strength for middle and lower trap in order to demonstrate improved shoulder stability.   STATUS:  Partially met  STG 2a: 6 weeks:   The patient will demonstrate 4+/5 strength for R shoulder flexion, abduction, and external rotation and 4/5 strength for middle and lower trap in order to demonstrate improved shoulder stability. STATUS:  met  STG2b:  6 weeks:  The patient will be independent with home exercises.    STATUS:  New      3. The patient complains of pain to the R shoulder.   LTG 3: 12 weeks:  The patient will report a pain rating of 2/10 or better in order to improve sleep quality and tolerance to performance of activities of daily living.   STATUS:  met  STG 3a: 6 weeks:  The patient will report a pain rating of 3/10 or better.    STATUS:  met    TREATMENT: Manual therapy, therapeutic exercise, home exercise instruction, and modalities as needed to include: electrical stimulation, ultrasound, moist heat, and ice.     Plan  Plan details: Discharge.        Visit Diagnoses:    ICD-10-CM ICD-9-CM   1. Acute pain of right shoulder  M25.511 719.41   2. Shoulder weakness  R29.898 719.61       Progress per Plan of Care           Timed:  Manual Therapy:    0     mins  06876;  Therapeutic Exercise:    0     mins  56502;     Neuromuscular Kenroy:    0    mins  03185;    Therapeutic Activity:     0     mins  55711;     Gait Trainin     mins  30887;     Aquatic Therapy     0     mins  54982;     Ultrasound:     0     mins  00859;    Electrical Stimulation:    0     mins  29952 ( );    Untimed:  Electrical Stimulation:    0     mins  42281 ( );  Mechanical Traction:    0     mins  41514;     Timed Treatment:   0   mins   Total Treatment:     0   mins  Heather Salcido PT    [place] License:  [id number]

## 2022-11-21 ENCOUNTER — TELEMEDICINE (OUTPATIENT)
Dept: INTERNAL MEDICINE | Facility: CLINIC | Age: 22
End: 2022-11-21

## 2022-11-21 DIAGNOSIS — F41.9 ANXIETY AND DEPRESSION: Primary | ICD-10-CM

## 2022-11-21 DIAGNOSIS — F32.A ANXIETY AND DEPRESSION: Primary | ICD-10-CM

## 2022-11-21 PROCEDURE — 99213 OFFICE O/P EST LOW 20 MIN: CPT
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
## 2022-12-02 ENCOUNTER — OFFICE VISIT (OUTPATIENT)
Dept: PSYCHIATRY | Facility: CLINIC | Age: 22
End: 2022-12-02

## 2022-12-02 ENCOUNTER — TELEPHONE (OUTPATIENT)
Dept: PSYCHIATRY | Facility: CLINIC | Age: 22
End: 2022-12-02

## 2022-12-02 VITALS
SYSTOLIC BLOOD PRESSURE: 111 MMHG | HEART RATE: 91 BPM | DIASTOLIC BLOOD PRESSURE: 72 MMHG | BODY MASS INDEX: 29.55 KG/M2 | HEIGHT: 68 IN | WEIGHT: 195 LBS

## 2022-12-02 DIAGNOSIS — F41.9 ANXIETY: ICD-10-CM

## 2022-12-02 DIAGNOSIS — F12.20 MARIJUANA DEPENDENCE: ICD-10-CM

## 2022-12-02 DIAGNOSIS — F33.1 MAJOR DEPRESSIVE DISORDER, RECURRENT EPISODE, MODERATE: Primary | ICD-10-CM

## 2022-12-02 DIAGNOSIS — F50.82 AVOIDANT-RESTRICTIVE FOOD INTAKE DISORDER (ARFID): ICD-10-CM

## 2022-12-02 PROCEDURE — 90792 PSYCH DIAG EVAL W/MED SRVCS: CPT | Performed by: PSYCHIATRY & NEUROLOGY
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
## 2022-12-02 NOTE — TREATMENT PLAN
Multi-Disciplinary Problems (from Behavioral Health Treatment Plan)    Active Problems     Problem: Anxiety  Start Date: 12/02/22    Problem Details: The patient self-scales this problem as a 6 with 10 being the worst.        Goal Priority Start Date Expected End Date End Date    Patient will develop and implement behavioral and cognitive strategies to reduce anxiety and irrational fears. -- 12/02/22 -- --    Goal Details: Progress toward goal:  Not appropriate to rate progress toward goal since this is the initial treatment plan.        Goal Intervention Frequency Start Date End Date    Help patient explore past emotional issues in relation to present anxiety. PRN 12/02/22 --    Intervention Details: Duration of treatment until until remission of symptoms.        Goal Intervention Frequency Start Date End Date    Help patient develop an awareness of their cognitive and physical responses to anxiety. PRN 12/02/22 --    Intervention Details: Duration of treatment until until remission of symptoms.              Problem: Depression  Start Date: 12/02/22    Problem Details: The patient self-scales this problem as a 6 with 10 being the worst.        Goal Priority Start Date Expected End Date End Date    Patient will demonstrate the ability to initiate new constructive life skills outside of sessions on a consistent basis. -- 12/02/22 -- --    Goal Details: Progress toward goal:  Not appropriate to rate progress toward goal since this is the initial treatment plan.        Goal Intervention Frequency Start Date End Date    Assist patient in setting attainable activities of daily living goals. PRN 12/02/22 --    Goal Intervention Frequency Start Date End Date    Provide education about depression PRN 12/02/22 --    Intervention Details: Duration of treatment until until remission of symptoms.        Goal Intervention Frequency Start Date End Date    Assist patient in developing healthy coping strategies. PRN 12/02/22 --     Intervention Details: Duration of treatment until until remission of symptoms.              Problem: Eating Disorders  Start Date: 12/02/22    Problem Details: The patient self-scales this problem as a 6 with 10 being the worst.        Goal Priority Start Date Expected End Date End Date    Patient will develop healthy eating patterns while improving positive self regard. -- 12/02/22 -- --    Goal Details: Progress toward goal:  Not appropriate to rate progress toward goal since this is the initial treatment plan.        Goal Intervention Frequency Start Date End Date    Educate about healthy eating patterns. PRN 12/02/22 --    Intervention Details: Duration of treatment until until remission of symptoms.        Goal Intervention Frequency Start Date End Date    Identify triggers to unhealthy eating patterns. PRN 12/02/22 --    Intervention Details: Duration of treatment until until remission of symptoms.                           I have discussed and reviewed this treatment plan with the patient.

## 2022-12-09 ENCOUNTER — PATIENT ROUNDING (BHMG ONLY) (OUTPATIENT)
Dept: PSYCHIATRY | Facility: CLINIC | Age: 22
End: 2022-12-09

## 2022-12-09 NOTE — PROGRESS NOTES
December 9, 2022     VOICEMAIL    Hello, may I speak with Emilee Mark?    My name is KARLA    I am  with [facility]  [address] [place] [address] [phone].    PT DID NOT ANSWER, LVMTCB WITH ANY QUESTIONS AND WELCOMED HER TO OUR [facility]ACTICE [facility].    Thank you, and have a great day.

## 2023-01-05 ENCOUNTER — OFFICE VISIT (OUTPATIENT)
Dept: PSYCHIATRY | Facility: CLINIC | Age: 23
End: 2023-01-05
Payer: COMMERCIAL

## 2023-01-05 VITALS
DIASTOLIC BLOOD PRESSURE: 75 MMHG | SYSTOLIC BLOOD PRESSURE: 128 MMHG | WEIGHT: 198 LBS | BODY MASS INDEX: 30.01 KG/M2 | HEIGHT: 68 IN

## 2023-01-05 DIAGNOSIS — F50.82 AVOIDANT-RESTRICTIVE FOOD INTAKE DISORDER (ARFID): ICD-10-CM

## 2023-01-05 DIAGNOSIS — F12.20 MARIJUANA DEPENDENCE: ICD-10-CM

## 2023-01-05 DIAGNOSIS — F33.41 RECURRENT MAJOR DEPRESSION IN PARTIAL REMISSION: Primary | ICD-10-CM

## 2023-01-05 DIAGNOSIS — F41.9 ANXIETY: ICD-10-CM

## 2023-01-05 PROCEDURE — 99214 OFFICE O/P EST MOD 30 MIN: CPT | Performed by: PSYCHIATRY & NEUROLOGY

## 2023-01-05 PROCEDURE — 90833 PSYTX W PT W E/M 30 MIN: CPT | Performed by: PSYCHIATRY & NEUROLOGY
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
## 2023-08-04 ENCOUNTER — TELEPHONE (OUTPATIENT)
Dept: PSYCHIATRY | Facility: CLINIC | Age: 23
End: 2023-08-04
Payer: COMMERCIAL

## 2023-08-04 NOTE — TELEPHONE ENCOUNTER
PATIENT WAS REFERRED OUT TO PSYCHOTHERAPY ON 12/02/2022. SEVERAL ATTEMPTS MADE TO FOLLOW UP ON REFERRAL INCLUDING MAILING A CONTACT LETTER WITH NO SUCCESS. CLOSING OUT REFERRAL.

## 2024-11-12 ENCOUNTER — DOCUMENTATION (OUTPATIENT)
Dept: INTERNAL MEDICINE | Age: 24
End: 2024-11-12

## 2024-11-12 ENCOUNTER — OFFICE VISIT (OUTPATIENT)
Dept: INTERNAL MEDICINE | Age: 24
End: 2024-11-12
Payer: COMMERCIAL

## 2024-11-12 VITALS
BODY MASS INDEX: 31.67 KG/M2 | TEMPERATURE: 96.6 F | HEART RATE: 72 BPM | OXYGEN SATURATION: 100 % | DIASTOLIC BLOOD PRESSURE: 80 MMHG | WEIGHT: 209 LBS | HEIGHT: 68 IN | SYSTOLIC BLOOD PRESSURE: 126 MMHG

## 2024-11-12 DIAGNOSIS — B37.49 CANDIDIASIS OF GENITALIA: ICD-10-CM

## 2024-11-12 DIAGNOSIS — N89.8 VAGINAL ITCHING: Primary | ICD-10-CM

## 2024-11-12 DIAGNOSIS — Z30.011 ORAL CONTRACEPTION INITIAL PRESCRIPTION: ICD-10-CM

## 2024-11-12 LAB
C TRACH RRNA CVX QL NAA+PROBE: NOT DETECTED
N GONORRHOEA RRNA SPEC QL NAA+PROBE: NOT DETECTED

## 2024-11-12 PROCEDURE — 99214 OFFICE O/P EST MOD 30 MIN: CPT | Performed by: NURSE PRACTITIONER

## 2024-11-12 PROCEDURE — 87491 CHLMYD TRACH DNA AMP PROBE: CPT | Performed by: NURSE PRACTITIONER

## 2024-11-12 PROCEDURE — 87591 N.GONORRHOEAE DNA AMP PROB: CPT | Performed by: NURSE PRACTITIONER

## 2024-11-12 RX ORDER — NORGESTREL AND ETHINYL ESTRADIOL 0.3-0.03MG
1 KIT ORAL DAILY
Qty: 30 TABLET | Refills: 12 | Status: SHIPPED | OUTPATIENT
Start: 2024-11-12

## 2024-11-12 RX ORDER — FLUCONAZOLE 100 MG/1
100 TABLET ORAL DAILY
Qty: 3 TABLET | Refills: 0 | Status: SHIPPED | OUTPATIENT
Start: 2024-11-12

## 2024-11-12 NOTE — PATIENT INSTRUCTIONS
Will call the results of vaginal culture  Take pill each day at same time and use back up method any antibiotic therapy, discuss with dr tabares additional options

## 2024-11-12 NOTE — PROGRESS NOTES
"Chief Complaint  Vaginal Itching (Pt complains of vaginal itching over a month and wants to try some birth control)    Subjective      Emilee Flores is a 24 y.o. female who presents to [facility]     History of Present Illness  The patient presents for evaluation of vaginal itching and discuss getting birth control.  She has an upcoming appointment consultation with gynecology but it is not until February she is sexually active and would like to start on the pill.    She is experiencing vaginal itching, the duration of at least a month.. She reports a mild odor but no pain. She has tried Monistat, which provided some relief however she feels like it does come back.  She reports no burning sensation during urination. She denies any possibility of STD.  She is in a monogamous long-term relationship.      She has previously consulted a gynecologist in [place] at that time had a Pap smear which she states was within normal range and has an upcoming appointment with Caryn Aden in 02/2024.     She is interested in initiating birth control and has not previously used it. She has been considering an intrauterine device (IUD) for a long time, but recent conversations with others who have used IUDs have led her to reconsider. She is now interested in learning more about the birth control pill. She is sexually active.  Currently on no birth control.  She wishes not to become pregnant.    FAMILY HISTORY  No family history of breast cancer no family history of clotting disorders.         Objective   Vital Signs:   Vitals:    11/12/24 1330   BP: 126/80   BP Location: Left arm   Patient Position: Sitting   Cuff Size: Adult   Pulse: 72   Temp: 96.6 °F (35.9 °C)   TempSrc: Skin   SpO2: 100%   Weight: 94.8 kg (209 lb)   Height: 172.7 cm (67.99\")     Body mass index is 31.79 kg/m².    Wt Readings from Last 3 Encounters:   11/12/24 94.8 kg (209 lb)   01/05/23 89.8 kg (198 lb)   12/02/22 88.5 kg " (195 lb)     BP Readings from Last 3 Encounters:   11/12/24 126/80   01/05/23 128/75   12/02/22 111/72       Health Maintenance   Topic Date Due    TDAP/TD VACCINES (1 - Tdap) Never done    HEPATITIS C SCREENING  Never done    PAP SMEAR  Never done    BMI FOLLOWUP  12/21/2022    ANNUAL PHYSICAL  01/17/2023    COVID-19 Vaccine (4 - 2024-25 season) 02/01/2025 (Originally 9/1/2024)    INFLUENZA VACCINE  03/31/2025 (Originally 8/1/2024)    HPV VACCINES (1 - 3-dose series) 11/12/2025 (Originally 11/3/2014)    Pneumococcal Vaccine 0-64  Aged Out    CHLAMYDIA SCREENING  Discontinued       Physical Exam  Constitutional:       Appearance: Normal appearance.   HENT:      Head: Normocephalic.      Nose: Nose normal.      Mouth/Throat:      Mouth: Mucous membranes are moist.   Eyes:      Conjunctiva/sclera: Conjunctivae normal.      Pupils: Pupils are equal, round, and reactive to light.   Cardiovascular:      Rate and Rhythm: Normal rate and regular rhythm.   Pulmonary:      Effort: Pulmonary effort is normal.      Breath sounds: Normal breath sounds.   Abdominal:      General: Bowel sounds are normal.      Palpations: Abdomen is soft.   Musculoskeletal:         General: Normal range of motion.      Cervical back: Normal range of motion.   Skin:     General: Skin is warm and dry.   Neurological:      General: No focal deficit present.      Mental Status: She is alert and oriented to person, place, and time.   Psychiatric:         Mood and Affect: Mood normal.         Behavior: Behavior normal.         Thought Content: Thought content normal.          Physical Exam         Result Review :  The following data was reviewed by: BRENNAN García on 11/12/2024:    Labs  No visits with results within 2 Month(s) from this visit.   Latest known visit with results is:   Lab Requisition on 10/12/2022   Component Date Value Ref Range Status    QuantiFERON Incubation 10/12/2022 Incubation performed.   Final    QUANTIFERON-TB GOLD  PLUS 10/12/2022 Negative  Negative Final    No response to M tuberculosis antigens detected.  Infection with M tuberculosis is unlikely, but high risk  individuals should be considered for additional testing  (ATS/IDSA/CDC Clinical Practice Guidelines, 2017). The  reference range is an Antigen minus Nil result of <0.35 IU/mL.  Chemiluminescence immunoassay methodology    Hep B S Ab 10/12/2022 Reactive (A)  Non-Reactive Final    Varicella IgG 10/12/2022 194  Immune >165 index Final                                   Negative          <135                                 Equivocal    135 - 165                                 Positive          >165  A positive result generally indicates exposure to the  pathogen or administration of specific immunoglobulins,  but it is not indication of active infection or stage  of disease.    Rubeola IgG 10/12/2022 124.0  Immune >16.4 AU/mL Final                                     Negative        <13.5                                   Equivocal 13.5 - 16.4                                   Positive        >16.4  Presence of antibodies to Rubeola is presumptive evidence  of immunity except when acute infection is suspected.    Mumps IgG 10/12/2022 78.7  Immune >10.9 AU/mL Final                                    Negative         <9.0                                  Equivocal  9.0 - 10.9                                  Positive        >10.9  A positive result generally indicates past exposure to  Mumps virus or previous vaccination.    Rubella Antibodies, IgG 10/12/2022 4.54  Immune >0.99 index Final                                    Non-immune       <0.90                                  Equivocal  0.90 - 0.99                                  Immune           >0.99    QuantiFERON Criteria 10/12/2022 Comment   Final    QuantiFERON-TB Gold Plus is a qualitative indirect test for  M tuberculosis infection (including disease) and is intended for use  in conjunction with risk  assessment, radiography, and other medical  and diagnostic evaluations. The QuantiFERON-TB Gold Plus result is  determined by subtracting the Nil value from either TB antigen (Ag)  value. The Mitogen tube serves as a control for the test.    QUANTIFERON TB1 AG VALUE 10/12/2022 0.04  IU/mL Final    QUANTIFERON TB2 AG VALUE 10/12/2022 0.04  IU/mL Final    QuantiFERON Nil Value 10/12/2022 0.03  IU/mL Final    QuantiFERON Mitogen Value 10/12/2022 >10.00  IU/mL Final        Imaging  No Images in the past 120 days found..    Results         Procedures         ASSESSMENT & PLAN  Diagnoses and all orders for this visit:    1. Vaginal itching (Primary)  -     Gardnerella vaginalis, Trichomonas vaginalis, Candida albicans, DNA - Swab, Vagina; Future  -     Gardnerella vaginalis, Trichomonas vaginalis, Candida albicans, DNA - Swab, Vagina    2. Candidiasis of genitalia    3. Oral contraception initial prescription    Other orders  -     fluconazole (Diflucan) 100 MG tablet; Take 1 tablet by mouth Daily.  Dispense: 3 tablet; Refill: 0  -     norgestrel-ethinyl estradiol (Low-Ogestrel) 0.3-30 MG-MCG per tablet; Take 1 tablet by mouth Daily.  Dispense: 30 tablet; Refill: 12         Assessment & Plan  1. Vaginal itching.  The symptoms could be indicative of a yeast infection. A prescription for Diflucan has been provided, to be taken once daily for three days. A swab will be taken for further analysis. If the results indicate bacterial vaginosis, an antibiotic may be prescribed. She was advised to use condoms in conjunction with the pill when taking antibiotics, as these can reduce the effectiveness of the birth control pill.    2. Birth control initiation.  A low-dose estrogen birth control pill has been prescribed. The potential risks and side effects of this medication were discussed, including the possibility of breakthrough bleeding, nausea, and increased risk of blood clots. She was advised to take the pill consistently at  the same time each day and to use additional contraception when taking antibiotics. A handout detailing the side effects and risk of birth control pills was provided. She was instructed to discuss any concerns and options with Dr. Aden during her next appointment that is pending February 2025         BMI is >= 30 and <35. (Class 1 Obesity). The following options were offered after discussion;: weight loss educational material (shared in after visit summary), exercise counseling/recommendations, and nutrition counseling/recommendations           FOLLOW UP  No follow-ups on file.  Patient was given instructions and counseling regarding her condition or for health maintenance advice. Please see specific information pulled into the AVS if appropriate.     Patient or patient representative verbalized consent for the use of Ambient Listening during the visit with  BRENNAN García for chart documentation. 11/12/2024  14:16 EST    BRENNAN García  11/12/24  14:16 EST

## 2024-11-13 ENCOUNTER — CLINICAL SUPPORT (OUTPATIENT)
Dept: INTERNAL MEDICINE | Age: 24
End: 2024-11-13
Payer: COMMERCIAL

## 2024-11-13 ENCOUNTER — TELEPHONE (OUTPATIENT)
Dept: INTERNAL MEDICINE | Age: 24
End: 2024-11-13
Payer: COMMERCIAL

## 2024-11-13 DIAGNOSIS — N89.8 VAGINAL ITCHING: Primary | ICD-10-CM

## 2024-11-13 LAB
CANDIDA SPECIES: NEGATIVE
GARDNERELLA VAGINALIS: POSITIVE
T VAGINALIS DNA VAG QL PROBE+SIG AMP: NEGATIVE

## 2024-11-13 PROCEDURE — 87660 TRICHOMONAS VAGIN DIR PROBE: CPT | Performed by: NURSE PRACTITIONER

## 2024-11-13 PROCEDURE — 87510 GARDNER VAG DNA DIR PROBE: CPT | Performed by: NURSE PRACTITIONER

## 2024-11-13 PROCEDURE — 87480 CANDIDA DNA DIR PROBE: CPT | Performed by: NURSE PRACTITIONER

## 2024-11-13 NOTE — TELEPHONE ENCOUNTER
I have already talked to her she come in today. I have told her that she will need to speak to her GYN about checking to be compatible with the BC pill. Pt also asked about having some baseline labs ordered along with STD panel to be included.

## 2024-11-13 NOTE — TELEPHONE ENCOUNTER
Caller: Emilee Flores    Relationship: Self    Best call back number: [phone]    What orders are you requesting (i.e. lab or imaging): CHECK FOR COMPATIBLE DIFFERENT TYPES OF BIRTH CONTROL     Additional notes: PATIENT WOULD LIKE TO BE CHECKED TO SEE WHICH BIRTH CONTROL METHODS SHE WOULD BE COMPATIBLE WITH DOING.

## 2024-11-14 RX ORDER — METRONIDAZOLE 500 MG/1
500 TABLET ORAL 2 TIMES DAILY
Qty: 14 TABLET | Refills: 0 | Status: SHIPPED | OUTPATIENT
Start: 2024-11-14 | End: 2024-11-21

## 2024-11-26 ENCOUNTER — TELEMEDICINE (OUTPATIENT)
Dept: INTERNAL MEDICINE | Age: 24
End: 2024-11-26
Payer: COMMERCIAL

## 2024-11-26 DIAGNOSIS — F41.9 ANXIETY: Primary | ICD-10-CM

## 2024-11-26 PROCEDURE — 99213 OFFICE O/P EST LOW 20 MIN: CPT

## 2024-11-26 NOTE — ASSESSMENT & PLAN NOTE
Worsening anxiety and depression. She missed work today and yesterday due feeling overwhelmed.  Patient reports that she has had a lot going on for the last few months. She did recently get out of a long term relationship. She states she was going to get  this year but now that is not happening. She is going through a lot of change. She is still living in the same house as her ex-boyfriend and they work together.  She has not seen her therapist in awhile. She is scheduled to see her next week. She is supposed to start EMDR. She wants to do that first prior to starting any medications.  Patient states that some days are better than others.  She feels like her OCD is worsening. She does have intrusive thoughts, feeling overwhelmed.   She denies any thoughts of self-harm. She states she is trying to process the past few months.  Recommend she f/u with her therapist.   If symptoms worsen, she is to seek medical attention right away. Patient is agreeable    Suicide prevention Lifeline at 1-949.282.5147

## 2024-11-26 NOTE — PROGRESS NOTES
Mode of Visit: Video  Location of patient: home  You have chosen to receive care through a telehealth visit.  The patient has signed the video visit consent form.  The visit included audio and video interaction. No technical issues occurred during this visit.     Chief Complaint  Mental Health Problem (24 year old female here today to discuss her mental health)    Hayden Flores presents to [facility]  Mental Health Problem      Pt reports that she has had a lot going on for the last few months. She did recently get out of a long term relationship.   She states she was going to get  this year but now that is not happening. She is going through a lot of change. She is still living in the same house as her ex-boyfriend.  She has not seen her therapist in awhile. She is scheduled to see her next week. She is supposed to start EMDR. She wants to do that first before starting medications.   Patient states that some days are better than others.  She feels like her OCD is worsening. She does have intrusive thoughts, feeling overwhelmed.   She denies any thoughts of self-harm.     Objective   Vital Signs:   There were no vitals taken for this visit.    Physical Exam   Constitutional: She appears well-developed and well-nourished.   HENT:   Head: Normocephalic.   Eyes: EOM are normal.   Neck: Neck normal appearance.  Pulmonary/Chest: Effort normal.  No respiratory distress.  Neurological: She is alert.   Skin: Skin is warm and dry.   Psychiatric: She has a normal mood and affect.     Result Review :                 Assessment and Plan    Diagnoses and all orders for this visit:    1. Anxiety (Primary)  Assessment & Plan:  Worsening anxiety and depression. She missed work today and yesterday due feeling overwhelmed.  Patient reports that she has had a lot going on for the last few months. She did recently get out of a long term relationship. She states she was going  to get  this year but now that is not happening. She is going through a lot of change. She is still living in the same house as her ex-boyfriend and they work together.  She has not seen her therapist in awhile. She is scheduled to see her next week. She is supposed to start EMDR. She wants to do that first prior to starting any medications.  Patient states that some days are better than others.  She feels like her OCD is worsening. She does have intrusive thoughts, feeling overwhelmed.   She denies any thoughts of self-harm. She states she is trying to process the past few months.  Recommend she f/u with her therapist.   If symptoms worsen, she is to seek medical attention right away. Patient is agreeable    Suicide prevention Lifeline at 1-219.912.6126           Follow Up   Return in about 3 months (around 2/26/2025).  Patient was given instructions and counseling regarding her condition or for health maintenance advice. Please see specific information pulled into the AVS if appropriate.

## 2024-12-03 ENCOUNTER — TELEPHONE (OUTPATIENT)
Dept: INTERNAL MEDICINE | Age: 24
End: 2024-12-03
Payer: COMMERCIAL

## 2024-12-03 NOTE — TELEPHONE ENCOUNTER
Caller: Emilee Flores    Relationship to patient: Self    Best call back number: [phone]    Patient is needing: PATIENT CALLED STATING SHE WAS SEEN AT THE BEGINNING OF NOVEMBER AND WAS TOLD SHE WOULD BE GETTING PRESCRIBED BIRTH CONTROL PILLS THAT WOULD BE MAILED OUT TO HER. PATIENT STATES SHE HAS NEVER RECEIVED THIS MEDICATION AND WOULD LIKE TO CHECK ON THE STATUS OF THIS REQUEST.

## 2024-12-03 NOTE — TELEPHONE ENCOUNTER
Caller: Emilee Flores    Relationship to patient: Self    Best call back number: [phone]    Patient is needing: PATIENT RETURNING MISSED CALL. PATIENT STATES SHE SPOKE WITH THE PHARMACY AND THEY TOLD HER THE PRESCRIPTION WAS NEVER RECEIVED. CALLER STATES SHE WOULD PREFER THIS PRESCRIPTION BE SENT LOCALLY TO THE [facility] ON FILE.

## 2024-12-03 NOTE — TELEPHONE ENCOUNTER
Called pt no answer     Rx was sent to mail order pharmacy- she can call  [phone] to check the status.

## 2024-12-05 RX ORDER — NORGESTREL AND ETHINYL ESTRADIOL 0.3-0.03MG
1 KIT ORAL DAILY
Qty: 30 TABLET | Refills: 12 | Status: SHIPPED | OUTPATIENT
Start: 2024-12-05

## 2024-12-16 ENCOUNTER — TELEPHONE (OUTPATIENT)
Dept: INTERNAL MEDICINE | Age: 24
End: 2024-12-16
Payer: COMMERCIAL

## 2024-12-16 NOTE — TELEPHONE ENCOUNTER
Caller: Emilee Flores    Relationship to patient: Self    Best call back number: [phone]     Patient is needing: PATIENT CALLED STATING SHE HAS AN APPOINTMENT ON 12.17.24 AND NEEDS TO KNOW IF SHE SHOULD BE FASTING. PLEASE CALL TO ADVISE.

## 2024-12-17 ENCOUNTER — OFFICE VISIT (OUTPATIENT)
Dept: INTERNAL MEDICINE | Age: 24
End: 2024-12-17
Payer: COMMERCIAL

## 2024-12-17 VITALS
WEIGHT: 203 LBS | TEMPERATURE: 97.8 F | RESPIRATION RATE: 18 BRPM | SYSTOLIC BLOOD PRESSURE: 104 MMHG | OXYGEN SATURATION: 98 % | DIASTOLIC BLOOD PRESSURE: 70 MMHG | HEIGHT: 68 IN | HEART RATE: 76 BPM | BODY MASS INDEX: 30.77 KG/M2

## 2024-12-17 DIAGNOSIS — N89.8 VAGINAL IRRITATION: ICD-10-CM

## 2024-12-17 DIAGNOSIS — Z01.419 WELL WOMAN EXAM WITH ROUTINE GYNECOLOGICAL EXAM: Primary | ICD-10-CM

## 2024-12-17 DIAGNOSIS — Z12.4 SCREENING FOR CERVICAL CANCER: ICD-10-CM

## 2024-12-17 LAB
CANDIDA SPECIES: NEGATIVE
GARDNERELLA VAGINALIS: NEGATIVE
T VAGINALIS DNA VAG QL PROBE+SIG AMP: NEGATIVE

## 2024-12-17 PROCEDURE — 99395 PREV VISIT EST AGE 18-39: CPT

## 2024-12-17 PROCEDURE — 87660 TRICHOMONAS VAGIN DIR PROBE: CPT

## 2024-12-17 PROCEDURE — 87510 GARDNER VAG DNA DIR PROBE: CPT

## 2024-12-17 PROCEDURE — 87480 CANDIDA DNA DIR PROBE: CPT

## 2024-12-17 PROCEDURE — 87491 CHLMYD TRACH DNA AMP PROBE: CPT

## 2024-12-17 PROCEDURE — 87591 N.GONORRHOEAE DNA AMP PROB: CPT

## 2024-12-17 PROCEDURE — 87798 DETECT AGENT NOS DNA AMP: CPT

## 2024-12-17 PROCEDURE — 88175 CYTOPATH C/V AUTO FLUID REDO: CPT

## 2024-12-17 NOTE — PROGRESS NOTES
"Subjective   History of Present Illness    Emilee Flores is a 24 y.o. female who presents for annual exam.    History of Present Illness  The patient is a 24-year-old female who presents for a well-woman exam.    She has never been pregnant and is currently sexually active with one partner, having had only one other sexual partner in the past. Her last menstrual period coincided with her previous visit on 11/13/2024, during which she was tested for bacterial vaginosis (BV). She has not menstruated since then, attributing this to her use of birth control. She reports persistent symptoms of BV, including general discomfort and itching, similar to those experienced prior to her last visit. She has no history of abnormal Pap smears, with her only previous test yielding normal results. She has not received the Gardasil vaccine. She does not engage in monthly self-breast examinations. She declines both the influenza and COVID-19 vaccines. She was prescribed Flagyl for the BV.    SOCIAL HISTORY  She does not smoke or drink alcohol. She wears her seatbelt.    FAMILY HISTORY  She reports no family history of uterine, cervical, ovarian, colon, or breast cancer.    MEDICATIONS  Current: Flagyl    IMMUNIZATIONS  She has not had the Gardasil vaccine. She declined the influenza and COVID-19 vaccines.      Obstetric History:  OB History    No obstetric history on file.     G-0      Menstrual History:  LMP 11/13/2024  She is on birth control    Sexual History:  Sexually active-1 partner       No results found for: \"HPVAPTIMA\"    Current contraception: condoms and OCP (estrogen/progesterone)  History of abnormal Pap smear: no  Received Gardasil immunization: no  Family history of uterine, cervical, breast, or ovarian cancer: no  Family History of colon cancer/colon polyps: no  Regular self breast exam: counseling provided      The following portions of the patient's history were reviewed and updated as appropriate: allergies, current " medications, past family history, past medical history, past social history, past surgical history, and problem list.    Review of Systems      Objective   Physical Exam  Vitals reviewed. Exam conducted with a chaperone present.   Constitutional:       General: She is not in acute distress.     Appearance: Normal appearance. She is well-developed. She is not diaphoretic.   HENT:      Head: Normocephalic and atraumatic. Hair is normal.      Right Ear: Hearing, tympanic membrane, ear canal and external ear normal. No decreased hearing noted. No drainage.      Left Ear: Hearing, tympanic membrane, ear canal and external ear normal. No decreased hearing noted.      Nose: Nose normal. No nasal deformity.      Mouth/Throat:      Mouth: Mucous membranes are moist.   Eyes:      General: Lids are normal.         Right eye: No discharge.         Left eye: No discharge.      Extraocular Movements: Extraocular movements intact.      Conjunctiva/sclera: Conjunctivae normal.      Pupils: Pupils are equal, round, and reactive to light.   Neck:      Thyroid: No thyromegaly.      Vascular: No JVD.   Cardiovascular:      Rate and Rhythm: Normal rate and regular rhythm.      Pulses: Normal pulses.      Heart sounds: Normal heart sounds. No murmur heard.     No friction rub. No gallop.   Pulmonary:      Effort: Pulmonary effort is normal. No respiratory distress.      Breath sounds: Normal breath sounds. No wheezing or rales.   Chest:      Chest wall: No tenderness.   Breasts:     Breasts are symmetrical.      Right: Normal. No mass, nipple discharge, skin change or tenderness.      Left: Normal. No mass, nipple discharge, skin change or tenderness.   Abdominal:      General: Bowel sounds are normal. There is no distension.      Palpations: Abdomen is soft. There is no mass.      Tenderness: There is no abdominal tenderness. There is no guarding or rebound.      Hernia: No hernia is present.   Genitourinary:     General: Normal vulva.  "     Urethra: No urethral swelling.      Vagina: Normal. No vaginal discharge or lesions.      Cervix: Normal.      Uterus: Normal. Not tender.       Adnexa: Right adnexa normal and left adnexa normal.        Right: No tenderness.          Left: No mass.     Musculoskeletal:         General: No tenderness or deformity. Normal range of motion.      Cervical back: Normal range of motion and neck supple.   Lymphadenopathy:      Cervical: No cervical adenopathy.   Skin:     General: Skin is warm and dry.      Findings: No erythema or rash.   Neurological:      Mental Status: She is alert and oriented to person, place, and time.      Cranial Nerves: No cranial nerve deficit.      Motor: No abnormal muscle tone.      Coordination: Coordination normal.      Deep Tendon Reflexes: Reflexes are normal and symmetric. Reflexes normal.   Psychiatric:         Mood and Affect: Mood normal.         Behavior: Behavior normal.         Thought Content: Thought content normal.         Judgment: Judgment normal.         /70 (BP Location: Left arm, Patient Position: Sitting)   Pulse 76   Temp 97.8 °F (36.6 °C) (Temporal)   Resp 18   Ht 172.7 cm (67.99\")   Wt 92.1 kg (203 lb)   SpO2 98%   BMI 30.88 kg/m²   Wt Readings from Last 3 Encounters:   12/17/24 92.1 kg (203 lb)   11/12/24 94.8 kg (209 lb)   01/05/23 89.8 kg (198 lb)      BP Readings from Last 3 Encounters:   12/17/24 104/70   11/12/24 126/80   01/05/23 128/75          Assessment & Plan   Diagnoses and all orders for this visit:    1. Well woman exam with routine gynecological exam (Primary)  -     Gardnerella vaginalis, Trichomonas vaginalis, Candida albicans, DNA - Swab, Vagina; Future  -     Gardnerella vaginalis, Trichomonas vaginalis, Candida albicans, DNA - Swab, Vagina    2. Screening for cervical cancer  -     Pap IG, Ct-Ng TV Rfx HPV All; Future  -     Pap IG, Ct-Ng TV Rfx HPV All    3. Vaginal irritation  -     Gardnerella vaginalis, Trichomonas vaginalis, " Candida albicans, DNA - Swab, Vagina; Future  -     Gardnerella vaginalis, Trichomonas vaginalis, Candida albicans, DNA - Swab, Vagina        Assessment & Plan  1. Well-woman examination.  Her vital signs are within normal limits. She has not received the Gardasil vaccine. She declines both the influenza and COVID-19 vaccines. She was advised to perform monthly self-breast examinations at the same time each month, avoiding periods of menstruation. The Gardasil vaccine was recommended, which she can receive at her local pharmacy. This vaccine helps protect against HPV, which can lead to cervical cancer. A Pap smear was conducted during this visit, and the results are expected within 5 to 7 days. A swab test was also performed to screen for bacterial vaginosis, yeast infection, and certain STDs such as chlamydia and gonorrhea. She will be contacted with the results.        All questions answered.  Await pap smear results.  Breast self exam technique reviewed and patient encouraged to perform self-exam monthly.  Diagnosis explained in detail, including differential.  Dietary diary.  Discussed healthy lifestyle modifications.  Pap smear.         Patient or patient representative verbalized consent for the use of Ambient Listening during the visit with  BRENNAN Pitts for chart documentation. 12/17/2024  16:25 EST

## 2024-12-23 LAB
C TRACH RRNA CVX QL NAA+PROBE: NEGATIVE
CONV .: NORMAL
CYTOLOGIST CVX/VAG CYTO: NORMAL
CYTOLOGY CVX/VAG DOC CYTO: NORMAL
CYTOLOGY CVX/VAG DOC THIN PREP: NORMAL
DX ICD CODE: NORMAL
Lab: NORMAL
N GONORRHOEA RRNA CVX QL NAA+PROBE: NEGATIVE
OTHER STN SPEC: NORMAL
STAT OF ADQ CVX/VAG CYTO-IMP: NORMAL
T VAGINALIS RRNA SPEC QL NAA+PROBE: NEGATIVE

## 2025-02-03 NOTE — PROGRESS NOTES
"Well Woman Visit    CC: Scheduled annual well gyn visit  Chief Complaint   Patient presents with    Annual Exam         Contraception:  Birth control pill    Last Completed Pap Smear            Ordered - PAP SMEAR (Every 3 Years) Ordered on 2024  Done    2024  IGP,CtNgTv,rfx Apt HPV All                   Last Completed Mammogram       This patient has no relevant Health Maintenance data.             HPI:   24 y.o.     History of Present Illness  The patient presents for an annual exam.    She has expressed a desire to continue her current regimen of birth control, which she reports as being effective and without any associated complications. She is currently on a monthly refill schedule for this medication.    She declines blood work today. She agrees to be screened for gonorrhea and chlamydia with her Pap smear.       Menses:   q 28 days, lasts 3-5 days, changes products q 4-6 hrs on heaviest days.   Pain:  None    PCP: does manage PMHx and preventative labs  History: PMHx, Meds, Allergies, PSHx, Social Hx, and POBHx all reviewed and updated.  PHYSICAL EXAM:  /77   Pulse 64   Ht 172.7 cm (67.99\")   Wt 88.5 kg (195 lb)   LMP 2025 (Approximate) Comment: bleeding for 3-4 weeks, starting off light, over the weeks it began to get heavier, clots, changing products every 2 hrs on heaviest days  Breastfeeding No   BMI 29.66 kg/m²  Not found.  General- NAD, alert and oriented, appropriate  Psych- Normal mood, good memory  Neck- No masses, no thyroid enlargement  CV- Regular rhythm, no murnurs  Resp- CTA to bases, no wheezes  Abdomen- Soft, non distended, non tender, no masses  Breast left-  Bilaterally symmetrical, no masses, non tender, no nipple discharge  Breast right- Bilaterally symmetrical, no masses, non tender, no nipple discharge  External genitalia- Normal female, no lesions  Urethra/meatus- Normal, no masses, non tender  Bladder- Normal, no masses, non " tender  Vagina- Normal, no atrophy, no lesions, no discharge.    Cvx- Normal, no lesions, no discharge, No cervical motion tenderness  Uterus- Normal size, shape & consistency.  Non tender, mobile.  Adnexa- No mass, non tender  Anus/Rectum/Perineum- Not performed  Lymphatic- No palpable neck, axillary, or groin nodes  Ext- No edema, no cyanosis    Skin- No lesions, no rashes, no acanthosis nigricans      ASSESSMENT and PLAN:    Diagnoses and all orders for this visit:    1. Well woman exam with routine gynecological exam (Primary)  -     IGP,rfx Aptima HPV All Pth    2. Encounter for surveillance of contraceptive pills  -     norgestrel-ethinyl estradiol (Low-Ogestrel) 0.3-30 MG-MCG per tablet; Take 1 tablet by mouth Daily.  Dispense: 84 tablet; Refill: 3    3. Screen for STD (sexually transmitted disease)  -     Chlamydia trachomatis, Neisseria gonorrhoeae, PCR - Swab, Cervix        Assessment & Plan  1. Annual examination.  Her physical examination yielded normal results. A Pap smear will be conducted today, which will also screen for gonorrhea and chlamydia.    2. Birth control management.  She is currently on birth control for contraceptive purposes and reports no issues with it. A prescription refill for her birth control medication has been provided, with a supply sufficient for one year. The prescription will be adjusted to a 3-month supply to reduce the frequency of pharmacy visits.    Follow-up  The patient will follow up in 1 year, or earlier if necessary.       Preventative:  CERVICAL CANCER Screening- Reviewed current ASCCP guidelines for screening w and wo cotest HPV, age specific.  Screen: Updated today  SEXUAL HEALTH: STD screening recommended.  Ordered  Follow up PCP/Specialist PMHx and/or Labs      She understands the importance of having any ordered tests to be performed in a timely fashion.  The risks of not performing them include, but are not limited to, advanced cancer stages, bone loss from  osteoporosis and/or subsequent increase in morbidity and/or mortality.  She is encouraged to review her results online and/or contact or office if she has questions.     Follow Up:  Return for Annual physical.            Caryn Aden MD  02/04/2025    [facility]  [facility] [place] [address]  Dept: [phone]  Dept Fax: [phone]  Loc: [phone]  Loc Fax: [phone]     Patient or patient representative verbalized consent for the use of Ambient Listening during the visit with  Caryn Aden MD for chart documentation. 2/4/2025  12:11 EST

## 2025-02-04 ENCOUNTER — OFFICE VISIT (OUTPATIENT)
Dept: OBSTETRICS AND GYNECOLOGY | Facility: CLINIC | Age: 25
End: 2025-02-04
Payer: COMMERCIAL

## 2025-02-04 VITALS
HEIGHT: 68 IN | SYSTOLIC BLOOD PRESSURE: 121 MMHG | DIASTOLIC BLOOD PRESSURE: 77 MMHG | BODY MASS INDEX: 29.55 KG/M2 | HEART RATE: 64 BPM | WEIGHT: 195 LBS

## 2025-02-04 DIAGNOSIS — Z30.41 ENCOUNTER FOR SURVEILLANCE OF CONTRACEPTIVE PILLS: ICD-10-CM

## 2025-02-04 DIAGNOSIS — Z11.3 SCREEN FOR STD (SEXUALLY TRANSMITTED DISEASE): ICD-10-CM

## 2025-02-04 DIAGNOSIS — Z01.419 WELL WOMAN EXAM WITH ROUTINE GYNECOLOGICAL EXAM: Primary | ICD-10-CM

## 2025-02-04 LAB
C TRACH RRNA CVX QL NAA+PROBE: NOT DETECTED
N GONORRHOEA RRNA SPEC QL NAA+PROBE: NOT DETECTED

## 2025-02-04 PROCEDURE — 87591 N.GONORRHOEAE DNA AMP PROB: CPT | Performed by: OBSTETRICS & GYNECOLOGY

## 2025-02-04 PROCEDURE — 87491 CHLMYD TRACH DNA AMP PROBE: CPT | Performed by: OBSTETRICS & GYNECOLOGY

## 2025-02-04 PROCEDURE — G0123 SCREEN CERV/VAG THIN LAYER: HCPCS | Performed by: OBSTETRICS & GYNECOLOGY

## 2025-02-04 RX ORDER — NORGESTREL AND ETHINYL ESTRADIOL 0.3-0.03MG
1 KIT ORAL DAILY
Qty: 84 TABLET | Refills: 3 | Status: SHIPPED | OUTPATIENT
Start: 2025-02-04

## 2025-02-06 LAB
CONV .: NORMAL
CYTOLOGIST CVX/VAG CYTO: NORMAL
CYTOLOGY CVX/VAG DOC CYTO: NORMAL
CYTOLOGY CVX/VAG DOC THIN PREP: NORMAL
DX ICD CODE: NORMAL
OTHER STN SPEC: NORMAL
SERVICE CMNT-IMP: NORMAL
STAT OF ADQ CVX/VAG CYTO-IMP: NORMAL

## 2025-03-18 ENCOUNTER — OFFICE VISIT (OUTPATIENT)
Age: 25
End: 2025-03-18
Payer: COMMERCIAL

## 2025-03-18 VITALS
DIASTOLIC BLOOD PRESSURE: 72 MMHG | WEIGHT: 187.2 LBS | BODY MASS INDEX: 28.37 KG/M2 | HEART RATE: 105 BPM | OXYGEN SATURATION: 98 % | HEIGHT: 68 IN | SYSTOLIC BLOOD PRESSURE: 120 MMHG

## 2025-03-18 DIAGNOSIS — R52 BODY ACHES: Primary | ICD-10-CM

## 2025-03-18 DIAGNOSIS — U07.1 COVID-19: ICD-10-CM

## 2025-03-18 LAB
EXPIRATION DATE: ABNORMAL
FLUAV AG UPPER RESP QL IA.RAPID: NOT DETECTED
FLUBV AG UPPER RESP QL IA.RAPID: NOT DETECTED
INTERNAL CONTROL: ABNORMAL
Lab: ABNORMAL
SARS-COV-2 AG UPPER RESP QL IA.RAPID: DETECTED

## 2025-03-18 PROCEDURE — 87428 SARSCOV & INF VIR A&B AG IA: CPT | Performed by: NURSE PRACTITIONER

## 2025-03-18 PROCEDURE — 99214 OFFICE O/P EST MOD 30 MIN: CPT | Performed by: NURSE PRACTITIONER

## 2025-03-18 NOTE — PROGRESS NOTES
"Chief Complaint  Illness (Coughing, congestion, body aches, has not ck for fever, chills )    Subjective      Emilee Flores is a 24 y.o. female who presents to [facility]     History of Present Illness  The patient presents for evaluation of COVID-19.    She recently attended a concert on Wednesday, following which she began experiencing symptoms indicative of COVID-19 on Saturday night. She has not monitored her temperature but reports feeling unwell upon waking and before bedtime.  She reports having chills body aches cough and sore throat.  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 She experienced significant congestion during the initial days of her illness, to the extent that it caused a rattling sensation in her chest, but this symptom has since improved. She experiences shortness of breath during coughing episodes. .. She has a history of asthma but is unable to use her inhaler due to a concurrent diagnosis of supraventricular tachycardia (SVT), as advised by her healthcare provider.  She has taken Paxlovid in the past tolerated it well.    Supplemental Information  Her current medication regimen includes a low-dose birth control pill.    SOCIAL HISTORY  She does not smoke or use tobacco products. She is employed at a nursing home.         Objective   Vital Signs:   Vitals:    03/18/25 1533   BP: 120/72   Pulse: 105   SpO2: 98%   Weight: 84.9 kg (187 lb 3.2 oz)   Height: 172.7 cm (67.99\")     Body mass index is 28.47 kg/m².    Wt Readings from Last 3 Encounters:   03/18/25 84.9 kg (187 lb 3.2 oz)   02/04/25 88.5 kg (195 lb)   12/17/24 92.1 kg (203 lb)     BP Readings from Last 3 Encounters:   03/18/25 120/72   02/04/25 121/77   12/17/24 104/70       Health Maintenance   Topic Date Due    HEPATITIS C SCREENING  Never done    COVID-19 Vaccine (4 - 2024-25 season) 09/01/2024    INFLUENZA VACCINE  03/31/2025 (Originally 7/1/2024)    HPV VACCINES (1 - " 3-dose series) 11/12/2025 (Originally 11/3/2014)    BMI FOLLOWUP  11/12/2025    ANNUAL PHYSICAL  12/17/2025    PAP SMEAR  02/04/2028    TDAP/TD VACCINES (2 - Td or Tdap) 10/20/2032    Pneumococcal Vaccine 0-49  Aged Out    CHLAMYDIA SCREENING  Discontinued       Physical Exam  Constitutional:       Appearance: Normal appearance.   HENT:      Head: Normocephalic.      Nose: Nose normal.      Mouth/Throat:      Mouth: Mucous membranes are moist.   Eyes:      Conjunctiva/sclera: Conjunctivae normal.      Pupils: Pupils are equal, round, and reactive to light.   Cardiovascular:      Rate and Rhythm: Normal rate and regular rhythm.   Pulmonary:      Effort: Pulmonary effort is normal.      Breath sounds: Normal breath sounds.   Abdominal:      General: Bowel sounds are normal.      Palpations: Abdomen is soft.   Musculoskeletal:         General: Normal range of motion.      Cervical back: Normal range of motion.   Skin:     General: Skin is warm and dry.   Neurological:      General: No focal deficit present.      Mental Status: She is alert and oriented to person, place, and time.   Psychiatric:         Mood and Affect: Mood normal.         Behavior: Behavior normal.         Thought Content: Thought content normal.          Physical Exam  No wheezing detected in the lungs.    Vital Signs  Oxygen saturation, heart rate, and blood pressure are normal.       Result Review :  The following data was reviewed by: BRENNAN García on 03/18/2025:    Labs  Office Visit on 03/18/2025   Component Date Value Ref Range Status    SARS Antigen 03/18/2025 Detected (A)  Not Detected, Presumptive Negative Final    Influenza A Antigen NEVIN 03/18/2025 Not Detected  Not Detected Final    Influenza B Antigen NEVIN 03/18/2025 Not Detected  Not Detected Final    Internal Control 03/18/2025 Passed  Passed Final    Lot Number 03/18/2025 [id number]   Final    Expiration Date 03/18/2025 [id number]   Final   Office Visit on 02/04/2025    Component Date Value Ref Range Status    Diagnosis 02/04/2025 Comment   Final    NEGATIVE FOR INTRAEPITHELIAL LESION OR MALIGNANCY.    Specimen adequacy: 02/04/2025 Comment   Final    Satisfactory for evaluation.  Endocervical and/or squamous metaplastic  cells (endocervical component) are present.    Clinician Provided ICD-10: 02/04/2025 Comment   Final    Z01.419    Performed by: 02/04/2025 Comment   Final    Heather North, Cytotechnologist ([facility])    . 02/04/2025 .   Final    Note: 02/04/2025 Comment   Final    The Pap smear is a screening test designed to aid in the detection of  premalignant and malignant conditions of the uterine cervix.  It is not a  diagnostic procedure and should not be used as the sole means of detecting  cervical cancer.  Both false-positive and false-negative reports do occur.    Method: 02/04/2025 Comment   Final    This liquid based ThinPrep(R) pap test was screened with the  use of an image guided system.    Conv .conv 02/04/2025 Comment   Final    The HPV DNA reflex criteria were not met with this specimen result  therefore, no HPV testing was performed.    Chlamydia DNA by PCR 02/04/2025 Not Detected  Not Detected  Final    Neisseria gonorrhoeae by PCR 02/04/2025 Not Detected  Not Detected  Final        Imaging  No Images in the past 120 days found..    Results  Laboratory Studies  COVID-19 test is positive.       Procedures         ASSESSMENT & PLAN  Diagnoses and all orders for this visit:    1. Body aches (Primary)  -     POCT SARS-CoV-2 Antigen NEVIN + Flu    2. COVID-19  -     Nirmatrelvir & Ritonavir, 300mg/100mg, (PAXLOVID); Take 3 tablets by mouth 2 (Two) Times a Day.  Dispense: 30 tablet; Refill: 0         Assessment & Plan  1. COVID-19.  She tested positive for COVID-19 and reported symptoms starting on Saturday night. Her oxygen levels, heart rate, and blood pressure are within normal ranges. No wheezing was detected during the examination. A prescription for Paxlovid, to  be taken twice daily, has been sent to [facility] on [facility]. She is advised to isolate for 5 days and wear a mask for an additional 5 days upon returning to work. She is instructed to maintain hydration, rest, and perform cough and deep breathing exercises every 2 hours while awake. She should avoid smoking and exposure to respiratory irritants. A work note will be provided. If her cough persists, worsens, or if she experiences shortness of breath, she should return for further evaluation.                  FOLLOW UP  Return if symptoms worsen or fail to improve.  Patient was given instructions and counseling regarding her condition or for health maintenance advice. Please see specific information pulled into the AVS if appropriate.     Patient or patient representative verbalized consent for the use of Ambient Listening during the visit with  BRENNAN García for chart documentation. 3/18/2025  16:45 EDT    BRENNAN García  03/18/25  16:45 EDT

## 2025-03-18 NOTE — PATIENT INSTRUCTIONS
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

## 2025-05-30 ENCOUNTER — TELEPHONE (OUTPATIENT)
Dept: INTERNAL MEDICINE | Age: 25
End: 2025-05-30
Payer: COMMERCIAL

## 2025-05-30 NOTE — TELEPHONE ENCOUNTER
Called pt no answer - pt would nee dto be seen for treatment- she can go to [facility] or make an appt for next week if s/s persist

## 2025-05-30 NOTE — TELEPHONE ENCOUNTER
Caller: Emilee Flores    Relationship: Self    Best call back number: [phone]     What medication are you requesting: UTI MEDICATION    What are your current symptoms: PATIENT DIAGNOSED WITH UTI. PATIENT PRESCRIBED MEDICATION BUT DIDN'T CLEAR UP SYMPTOMS    How long have you been experiencing symptoms: OVER A WEEK. AROUND 12 DAYS TOTAL    Have you had these symptoms before:    [x] Yes  [] No    Have you been treated for these symptoms before:   [x] Yes  [] No    If a prescription is needed, what is your preferred pharmacy and phone number: [id number] - [place], [place] - [address] AT [facility] [phone] [facility] [phone] FX

## 2025-05-31 ENCOUNTER — DOCUMENTATION (OUTPATIENT)
Dept: INTERNAL MEDICINE | Age: 25
End: 2025-05-31
Payer: COMMERCIAL

## 2025-05-31 RX ORDER — NITROFURANTOIN 25; 75 MG/1; MG/1
100 CAPSULE ORAL 2 TIMES DAILY
Qty: 14 CAPSULE | Refills: 0 | Status: SHIPPED | OUTPATIENT
Start: 2025-05-31